# Patient Record
(demographics unavailable — no encounter records)

---

## 2017-06-12 NOTE — DIAGNOSTIC IMAGING REPORT
CHEST CT WITHOUT CONTRAST



CT DOSE: 372.11 mGy.cm



HISTORY:      I73.9 PAD (peripheral artery disease)R91.1 Solitary pulmonary

nodule



TECHNIQUE: Multiaxial CT images of the chest were performed without contrast.



COMPARISON: Chest CT 12/12/2016.



FINDINGS: Postoperative changes consistent with prior left upper lobectomy.

Peripheral scarlike densities within the left midlung zone are similar to the

prior study. Faint patchy area of groundglass densities are seen within the base

of the left lower lobe. There is a 5 mm groundglass nodule within the right lung

apex on image 72. No pleural effusions. No pneumothorax. The central airways are

patent. Left-sided thoracotomy changes are again noted. Normal adrenal glands

and spleen. A few small hypodense lesions within the liver remain stable. No

mediastinal or hilar lymphadenopathy.



IMPRESSION: 



1. Faint patchy area of groundglass densities within the base of the left lower

lobe and a new 5 mm groundglass nodule within the right lung apex. This may

represent mild infectious change. Six-month follow-up is recommended to ensure

resolution.

2. Postoperative changes consistent with left upper lobectomy. Stable peripheral

scarlike densities within the left midlung zone. 







Electronically signed by:  Shimon Rodríguez M.D.

6/12/2017 7:44 AM



Dictated Date/Time:  6/12/2017 7:38 AM

## 2017-12-14 NOTE — DIAGNOSTIC IMAGING REPORT
CT SCAN OF THE CHEST WITHOUT IV CONTRAST



CLINICAL HISTORY: Follow-up lung cancer.



COMPARISON STUDY:  Chest CT scans dated 6/12/2017 and 8/18/2015.



TECHNIQUE:  CT scan of the thorax was performed from the thoracic inlet to the

upper abdomen. Images are reviewed in the axial, sagittal, and coronal planes.

IV contrast was not administered for this examination as per the referring

clinician.  A dose lowering technique was utilized adhering to the principles of

ALARA.



CT DOSE: 405.55 mGy.cm



FINDINGS:



Thyroid: Imaged portions of the thyroid gland are normal in size and

attenuation.



Thoracic aorta: There is atherosclerotic calcification of the thoracic aorta,

which is normal in caliber and demonstrates bovine variant arch anatomy.



Heart: The heart is normal in size and without pericardial effusion.



Lungs and pleural spaces: Mild emphysema is observed. There is volume loss and

postoperative change in the left upper lung consistent with previous surgical

resection. No airspace consolidation is seen typical for pneumonia and there is

no pleural effusion. The trachea and central airways are clear. Subpleural

reticulation and fibrosis in the left upper lobe is similar to previous and

likely represents treatment related change. No recurrent or residual pulmonary

lesion is identified. A 2 mm left lower lobe nodule seen on image #179 and a 3

mm right middle lobe pulmonary nodule seen on image #193 have been present

dating back to 2015 and are of doubtful significance. The 5 mm apical

groundglass density seen on 6/12/2017 is most completely resolved. There is a 2

mm nodule at this site presently (axial image #68).



Mediastinum: There is no mediastinal lymphadenopathy.



Nayeli: Not well assessed without IV contrast.



Axillae: There is no axillary lymphadenopathy.



Upper abdomen: A subcentimeter hypodensity in the left hepatic lobe likely

represents a cyst but is too small for definitive characterization. This is

unchanged over multiple prior studies. The partially imaged kidneys symmetric

cortical atrophy. No adrenal lesion is seen.



Skeletal structures: The skeletal structures are osteopenic. Degenerative change

is noted in the thoracic spine. Postoperative change is seen in the left sided

ribs. No lytic or blastic bony lesions are seen.





IMPRESSION:



1. There is no convincing evidence of recurrent or metastatic disease in the

chest.



2. Mild emphysema with postoperative change in the left upper lobe as above.



3. Subpleural reticulation/fibrosis in the anterior left lung is unchanged from

previous and likely represents treatment related change.



4. No airspace consolidation is seen typical for pneumonia and there is no

pleural effusion.



5. Additional findings as above.







Electronically signed by:  Alfonzo Lake M.D.

12/14/2017 7:58 AM



Dictated Date/Time:  12/14/2017 7:52 AM